# Patient Record
(demographics unavailable — no encounter records)

---

## 2024-10-10 NOTE — HISTORY OF PRESENT ILLNESS
[de-identified] : Patient is status post left distal biceps tendon repair on 10/2/2023.  Having some soreness  Having decreased sensation over ulnar 3 digits, this has been since the injury and is on the operative elbow where he injured his biceps  Left elbow: Incisions healed and staples removed, biceps tendon in normal position within normal hook and biceps tendon distally retracted position, no gross motor or sensory abnormalities, compartment soft and nontender, rom 2-130 (able to passively fully extend), lacks 10 deg supination, weakness and atrophy to biceps, decreased sensation ulna 3 digits with compression over ulnar nerve  right elbow: ttp lat epicondyle, pain with res elbow ext at lateral epicondyle, nvi  Status post left distal biceps tendon repair and right elbow lateral epicondylitis, with possible left elbow cubital tunnel syndrome went over findings explained the surgery will cont with ortho hand  cont cons tx for right elbow lateral epicondylitis cont pt and pain control fu in 2 months  not working with temp total disability

## 2024-11-13 NOTE — ASSESSMENT
[FreeTextEntry1] : The patient is here for follow-up after left cubital tunnel release and endoscopic carpal tunnel release. He is still in therapy and wants to know how intense he can exercise. When exercising he will feel tingling shooting down to his hand from his elbow when he starts to feel a "burn" in his muscles. When he is at rest and his arm is hanging down he feels a tingling in his arm. The numbness at rest in his hand is improved, previously he had burning pain and more intense baseline numbness that would flare up worse.  He did not have tingling prior.  He only had numbness and burning pain.  Left upper extremity: Incision site is well-healed, slight sensitivity at the elbow incision, no change in range of motion of the elbow, incision site at the wrist healed, scar tissue in both areas, sensation somewhat improved however still decreased  We discussed not overdoing the  strengthening or flexing the elbow past 90 degrees and to discuss this with his therapist. We discussed pushing himself without pushing himself to the point of numbness. We discussed that the tingling he is feeling could signal the regeneration of the nerve.  He will follow up in 8 weeks for evaluation.

## 2024-11-18 NOTE — HISTORY OF PRESENT ILLNESS
[de-identified] : 52-year-old male returns for follow-up.  Continues to have neck pain radiating down his entire left arm.  It is fairly significant interfering with his quality of life.  No loss of bladder or bowel.

## 2024-11-18 NOTE — DISCUSSION/SUMMARY
[de-identified] : 52-year-old male with cervical radiculopathy.  He has multilevel degenerative disc disease and foraminal stenosis.  I would have him follow-up with Dr. Mc from pain management to discuss injections.  If he fails injections he could be a candidate for cervical spine surgery.  I will see him back in 3 months.

## 2024-11-18 NOTE — DATA REVIEWED
[FreeTextEntry1] : I reviewed the patient's MRI of his cervical spine he has multilevel degenerative disc disease.  He has foraminal stenosis C3-C7.
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
## 2024-12-11 NOTE — DATA REVIEWED
[FreeTextEntry1] : MRI of the cervical spine taken @ [facility] on 7- showed marked straightening of the usual lordosis. There ois a disc bulge at the C2-3 level where disc material approximates the ventral thecal sac and is encroaching on the ventral subarachnoid space. At this level a contour abnormality of the thecal sac due to the annulus fibrosus of the disc appreciated. There is also cerebrospinal fluid displacement due to the combination of the annulus fibrosus in combination with the nucleus pulposus of the disc, encroaching on the thecal sac. There is a disc bulge at the C3-4 level where disc material approximates the ventral thecal sac and is encroaching on the ventral subarachnoid space. At the C4-5, there is as shallow central disc herniation encroaching on the thecal sac and effacing the ventral subarachnoid space, producing mass effect on the cervical cord. At C5-6, there is a shallow central disc herniation encroaching on the thecal sac and effacing the ventral subarachnoid space, producing mass effect on the cervical cord. At C6-7, there is a shallow central disc herniation encroaching on the thecal sac and effacing the ventral subarachnoid space, producing mass effect on the cervical cord. There is a posterior annular tear at the C4-5 level characteriszed by high signal focus on the long TR sagittal images.

## 2024-12-11 NOTE — DISCUSSION/SUMMARY
[de-identified] : A discussion regarding available pain management treatment options occurred with the patient. These included interventional, rehabilitative, pharmacological, and alternative modalities. We will proceed with the following:    Interventional treatment options: 1. Proceed with a C7-T1 cervical epidural steroid injection under fluoroscopic guidance and sedation.  Treatment options were discussed. The patient has been having persistent, severe neck pain and cervical radicular pain that has minimally improved with conservative therapy thus far (including physical therapy/chiropractic manipulations/home stretching and anti-inflammatory medications for 8 weeks. The patient was given the option of proceeding with a cervical epidural steroid injection to try to get the patient some pain relief.   The risks and benefits were discussed, which included the associated problems with steroids, bleeding, nerve injury, lack of improvement with pain, and 0.5% chance of a post dural puncture headache.   The patient has severe anxiety of procedures that necessitates monitored anesthesia care (MAC). The procedure performed will be close to major nerves, arteries, and spinal cord and/or joint structures. Due to the proximity of these structures, we need the patient to be still during the procedure.  With the help of MAC, this will be safely achieved and decrease the risk of any complications.   Rehabilitative options: 1. Participate in physical therapy for the cervical spine, 2/3x per week for 6-8 weeks.   Medications: 1. Ordered Gabapentin 300 mg q8h prn.   We are going to start gabapentin. Potential side effects were discussed which included dizziness, sedation, and pedal edema to name a few. If the patient cannot tolerate these side effects should they occur, then they will stop the medication. If the patient experiences sedation, they understand that they should not drive. The usage of the medication was discussed and the patient understands that it is an anti-epileptic medication used for neuropathic pain and that the pain relief from this medication will likely be subtle, and that hopefully in combination with the other treatments we are offering we will be able to get some additive relief.   - Follow up 2 weeks post injection.   I Pamela Guerrier attest that this documentation has been prepared under the direction and in the presence of provider Dr. Gideon Mc.  The documentation recorded by the scribe in my presence, accurately reflects the service I personally performed, and the decisions made by me with my edits as appropriate.   Gideon Mc, DO

## 2024-12-11 NOTE — PHYSICAL EXAM
[de-identified] : C spine  No rashes, erythema, edema noted TTP b/l cervical paraspinal and trapezius muscles Positive spurlings bilaterally RUE: 5/5 strength LUE: 5/5 strength

## 2024-12-11 NOTE — DISCUSSION/SUMMARY
[de-identified] : A discussion regarding available pain management treatment options occurred with the patient. These included interventional, rehabilitative, pharmacological, and alternative modalities. We will proceed with the following:    Interventional treatment options: 1. Proceed with a C7-T1 cervical epidural steroid injection under fluoroscopic guidance and sedation.  Treatment options were discussed. The patient has been having persistent, severe neck pain and cervical radicular pain that has minimally improved with conservative therapy thus far (including physical therapy/chiropractic manipulations/home stretching and anti-inflammatory medications for 8 weeks. The patient was given the option of proceeding with a cervical epidural steroid injection to try to get the patient some pain relief.   The risks and benefits were discussed, which included the associated problems with steroids, bleeding, nerve injury, lack of improvement with pain, and 0.5% chance of a post dural puncture headache.   The patient has severe anxiety of procedures that necessitates monitored anesthesia care (MAC). The procedure performed will be close to major nerves, arteries, and spinal cord and/or joint structures. Due to the proximity of these structures, we need the patient to be still during the procedure.  With the help of MAC, this will be safely achieved and decrease the risk of any complications.   Rehabilitative options: 1. Participate in physical therapy for the cervical spine, 2/3x per week for 6-8 weeks.   Medications: 1. Ordered Gabapentin 300 mg q8h prn.   We are going to start gabapentin. Potential side effects were discussed which included dizziness, sedation, and pedal edema to name a few. If the patient cannot tolerate these side effects should they occur, then they will stop the medication. If the patient experiences sedation, they understand that they should not drive. The usage of the medication was discussed and the patient understands that it is an anti-epileptic medication used for neuropathic pain and that the pain relief from this medication will likely be subtle, and that hopefully in combination with the other treatments we are offering we will be able to get some additive relief.   - Follow up 2 weeks post injection.   I Pamela Guerrier attest that this documentation has been prepared under the direction and in the presence of provider Dr. Gideon Mc.  The documentation recorded by the scribe in my presence, accurately reflects the service I personally performed, and the decisions made by me with my edits as appropriate.   Gideon Mc, DO

## 2024-12-11 NOTE — PHYSICAL EXAM
[de-identified] : C spine  No rashes, erythema, edema noted TTP b/l cervical paraspinal and trapezius muscles Positive spurlings bilaterally RUE: 5/5 strength LUE: 5/5 strength

## 2024-12-26 NOTE — PROCEDURE
[FreeTextEntry3] : Date of Service: 12/23/2024   Account: [id number]  Patient: JOSÉ MIGUEL INMAN   YOB: 1972  Age: 52 year  Surgeon:      Gideon Mc D.O.  Assistant:    None  Pre-Operative Diagnosis:         Cervical Radiculopathy (M54.12)  Post Operative Diagnosis:       Cervical Radiculopathy (M54.12)  Procedure:             Cervical (C7-T1) interlaminar epidural steroid injection under fluoroscopic guidance  Anesthesia:  MAC  This procedure was carried out using fluoroscopic guidance.  The risks and benefits of the procedure were discussed extensively with the patient.  The consent of the patient was obtained and the following procedure was performed. The patient was placed in the prone position on the fluoroscopy table and the area was prepped and draped in a sterile fashion.  A timeout was performed with all essential staff present and the site and side were verified.  The patient was placed in the prone position and optimized to patient comfort.  The cervical area was prepped and draped in a sterile fashion.  The fluoroscope visualized the C7-T1 interspace using slight cephalad-caudad angulation and this area was marked.  Using sterile technique the superficial skin was anesthetized with 1% Lidocaine.  A 20 gauge 3.5 inch Tuohy needle was advanced under fluoroscopy until ligament was engaged.  Using a contralateral oblique view, a "loss of resistance" to air technique was utilized in order to gain access to the epidural space.  After negative aspiration for heme and CSF, 1 cc of Omnipaque contrast was administered and the appropriate cervical epidurogram was obtained in the BIRGIT and A/P view as well as digital subtraction angiography.  A total injectate of 3 cc of preservative free normal saline and 10mg decadron was then injected into the epidural space while maintaining meaningful verbal contact with the patient.    The needle was subsequently removed.  Vital signs remained normal.  Pulse oximeter was used throughout the procedure and the patient's pulse and oxygen saturation remained within normal limits.  The patient tolerated the procedure well.  There were no complications.  The patient was instructed to apply ice over the injection sites for twenty minutes every two hours for the next 24 to 48 hours.  Disposition:       1. The patient was advised to F/U in 1-2 weeks to assess the response to the injection.      2. The patient was also instructed to contact me immediately if there were any concerns related to the procedure performed.
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
## 2025-02-04 NOTE — PHYSICAL EXAM
[de-identified] : C spine  No rashes, erythema, edema noted TTP b/l cervical paraspinal and trapezius muscles Positive spurlings L RUE: 5/5 strength LUE: Bicep 4/5 Tricep 4/5

## 2025-02-04 NOTE — DISCUSSION/SUMMARY
[de-identified] : A discussion regarding available pain management treatment options occurred with the patient. These included interventional, rehabilitative, pharmacological, and alternative modalities. We will proceed with the following:    Interventional treatment options: 1. Proceed with a C7-T1 cervical epidural steroid injection under fluoroscopic guidance. Treatment options were discussed. The patient has been having persistent, severe neck pain and cervical radicular pain that has minimally improved with conservative therapy thus far (including physical therapy/chiropractic manipulations/home stretching and anti-inflammatory medications for 8 weeks. The patient was given the option of proceeding with a cervical epidural steroid injection to try to get the patient some pain relief.   The risks and benefits were discussed, which included the associated problems with steroids, bleeding, nerve injury, lack of improvement with pain, and 0.5% chance of a post dural puncture headache.   Medications: 1. Ordered Gabapentin 300 mg q8h prn (30-day supply, 90 tablets) 2. Ordered Ibuprofen 800 mg q12h (30-day supply, 60 tablets)  I advised Mr. Baker that the NSAID should be taken with food.  In addition while taking the prescribed NSAID, no over the counter or other NSAIDs should be used, such as ibuprofen (Motrin or Advil) or naproxen (Aleve) as this can cause stomach upset or other side effects.  If needed for fever or breakthrough pain Tylenol can be used.  We are going to start gabapentin. Potential side effects were discussed which included dizziness, sedation, and pedal edema to name a few. If the patient cannot tolerate these side effects should they occur, then they will stop the medication. If the patient experiences sedation, they understand that they should not drive. The usage of the medication was discussed and the patient understands that it is an anti-epileptic medication used for neuropathic pain and that the pain relief from this medication will likely be subtle, and that hopefully in combination with the other treatments we are offering we will be able to get some additive relief.   - Follow up 2 weeks post injection.   I Pamela Guerrier attest that this documentation has been prepared under the direction and in the presence of provider Dr. Gideon Mc.  The documentation recorded by the scribe in my presence, accurately reflects the service I personally performed, and the decisions made by me with my edits as appropriate.   Gideon Mc, DO

## 2025-02-04 NOTE — PHYSICAL EXAM
[de-identified] : C spine  No rashes, erythema, edema noted TTP b/l cervical paraspinal and trapezius muscles Positive spurlings L RUE: 5/5 strength LUE: Bicep 4/5 Tricep 4/5

## 2025-02-04 NOTE — DISCUSSION/SUMMARY
[de-identified] : A discussion regarding available pain management treatment options occurred with the patient. These included interventional, rehabilitative, pharmacological, and alternative modalities. We will proceed with the following:    Interventional treatment options: 1. Proceed with a C7-T1 cervical epidural steroid injection under fluoroscopic guidance. Treatment options were discussed. The patient has been having persistent, severe neck pain and cervical radicular pain that has minimally improved with conservative therapy thus far (including physical therapy/chiropractic manipulations/home stretching and anti-inflammatory medications for 8 weeks. The patient was given the option of proceeding with a cervical epidural steroid injection to try to get the patient some pain relief.   The risks and benefits were discussed, which included the associated problems with steroids, bleeding, nerve injury, lack of improvement with pain, and 0.5% chance of a post dural puncture headache.   Medications: 1. Ordered Gabapentin 300 mg q8h prn (30-day supply, 90 tablets) 2. Ordered Ibuprofen 800 mg q12h (30-day supply, 60 tablets)  I advised Mr. Baker that the NSAID should be taken with food.  In addition while taking the prescribed NSAID, no over the counter or other NSAIDs should be used, such as ibuprofen (Motrin or Advil) or naproxen (Aleve) as this can cause stomach upset or other side effects.  If needed for fever or breakthrough pain Tylenol can be used.  We are going to start gabapentin. Potential side effects were discussed which included dizziness, sedation, and pedal edema to name a few. If the patient cannot tolerate these side effects should they occur, then they will stop the medication. If the patient experiences sedation, they understand that they should not drive. The usage of the medication was discussed and the patient understands that it is an anti-epileptic medication used for neuropathic pain and that the pain relief from this medication will likely be subtle, and that hopefully in combination with the other treatments we are offering we will be able to get some additive relief.   - Follow up 2 weeks post injection.   I Pamela Guerrier attest that this documentation has been prepared under the direction and in the presence of provider Dr. Gideon Mc.  The documentation recorded by the scribe in my presence, accurately reflects the service I personally performed, and the decisions made by me with my edits as appropriate.   Gideon Mc, DO
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
## 2025-02-12 NOTE — HISTORY OF PRESENT ILLNESS
[de-identified] : 52-year-old male presents to me with continued neck pain radiating down his left arm.  He has had 1 injection.  This helped him a little bit but still having pain he has more injections scheduled.  He is also complaining of some left elbow pain.  He feels like his left arm is weak.

## 2025-02-12 NOTE — DATA REVIEWED
[FreeTextEntry1] : I reviewed patient's MRI of the cervical spine he has multilevel degenerative disc disease with foraminal stenosis C4 C7

## 2025-02-13 NOTE — HISTORY OF PRESENT ILLNESS
[de-identified] : Patient is status post left distal biceps tendon repair on 10/2/2023.  Having some soreness  Having decreased sensation over ulnar 3 digits, this has been since the injury and is on the operative elbow where he injured his biceps  he has been having pain to the left shoulder  Left elbow: Incisions healed and staples removed, biceps tendon in normal position within normal hook and biceps tendon distally retracted position, no gross motor or sensory abnormalities, compartment soft and nontender, rom 2-130 (able to passively fully extend), lacks 10 deg supination, weakness and atrophy to biceps, decreased sensation ulna 3 digits with compression over ulnar nerve  right elbow: ttp lat epicondyle, pain with res elbow ext at lateral epicondyle, nvi  Left shoulder: TTP ant GH joint, bicipital groove FF 0-140 (passive 175) ER 40 IR L5 Pain with terminal rom Weakness to abduction and ER Pos Impingement Pos Quintero Pos Cross Arm Adduction Negative instability Positive scapula dyskinesia  xray left shoulder negative  Status post left distal biceps tendon repair and right elbow lateral epicondylitis, with possible left elbow cubital tunnel syndrome went over findings explained the surgery will cont with ortho hand  cont cons tx for right elbow lateral epicondylitis cont pt and pain control due to cont pain to left shoulder will get an mri fu in 2 months  not working with temp total disability

## 2025-04-10 NOTE — HISTORY OF PRESENT ILLNESS
[de-identified] : Patient is status post left distal biceps tendon repair on 10/2/2023.  Having some soreness  Having decreased sensation over ulnar 3 digits, this has been since the injury and is on the operative elbow where he injured his biceps  he has been having pain to the left shoulder  Left elbow: Incisions healed and staples removed, biceps tendon in normal position within normal hook and biceps tendon distally retracted position, no gross motor or sensory abnormalities, compartment soft and nontender, rom 2-130 (able to passively fully extend), lacks 10 deg supination, weakness and atrophy to biceps, decreased sensation ulna 3 digits with compression over ulnar nerve  right elbow: ttp lat epicondyle, pain with res elbow ext at lateral epicondyle, nvi  Left shoulder: TTP ant GH joint, bicipital groove FF 0-140 (passive 175) ER 40 IR L5 Pain with terminal rom Weakness to abduction and ER Pos Impingement Pos Quintero Pos Cross Arm Adduction Negative instability Positive scapula dyskinesia  xray left shoulder negative  mri left shoulder possible cystic lesion glenoid, pRCT high grade, biceps tenosynovitis, slap tear  Status post left distal biceps tendon repair and right elbow lateral epicondylitis, with possible left elbow cubital tunnel syndrome went over findings explained the surgery will cont with ortho hand  cont cons tx for right elbow lateral epicondylitis cont pt and pain control needs mri with and without contrast left shoulder to assess glenoid lesion  not working with temp total disability

## 2025-06-03 NOTE — HISTORY OF PRESENT ILLNESS
[de-identified] : Patient is status post left distal biceps tendon repair on 10/2/2023.  Having some soreness  Having decreased sensation over ulnar 3 digits, this has been since the injury and is on the operative elbow where he injured his biceps  he has been having pain to the left shoulder  Left elbow: Incisions healed and staples removed, biceps tendon in normal position within normal hook and biceps tendon distally retracted position, no gross motor or sensory abnormalities, compartment soft and nontender, rom 2-130 (able to passively fully extend), lacks 10 deg supination, weakness and atrophy to biceps, decreased sensation ulna 3 digits with compression over ulnar nerve  right elbow: ttp lat epicondyle, pain with res elbow ext at lateral epicondyle, nvi  Left shoulder: TTP ant GH joint, bicipital groove FF 0-140 (passive 175) ER 40 IR L5 Pain with terminal rom Weakness to abduction and ER Pos Impingement Pos Quintero Pos Cross Arm Adduction Negative instability Positive scapula dyskinesia  xray left shoulder negative  mri left shoulder possible cystic lesion glenoid, pRCT high grade, biceps tenosynovitis, slap tear  mri left shoulder IMPRESSION: 1.  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 2.  Additional findings as above.  Status post left distal biceps tendon repair and right elbow lateral epicondylitis, with possible left elbow cubital tunnel syndrome went over findings explained the surgery will cont with ortho hand  cont cons tx for right elbow lateral epicondylitis cont pt and pain control pt left shoulder, will think about surgery for shoulder if no improvement  not working with temp total disability

## 2025-07-15 NOTE — HISTORY OF PRESENT ILLNESS
[de-identified] : Patient is status post left distal biceps tendon repair on 10/2/2023.  Having some soreness  Having decreased sensation over ulnar 3 digits, this has been since the injury and is on the operative elbow where he injured his biceps  he has been having pain to the left shoulder  Left elbow: Incisions healed and staples removed, biceps tendon in normal position within normal hook and biceps tendon distally retracted position, no gross motor or sensory abnormalities, compartment soft and nontender, rom 2-130 (able to passively fully extend), lacks 10 deg supination, weakness and atrophy to biceps, decreased sensation ulna 3 digits with compression over ulnar nerve  right elbow: ttp lat epicondyle, pain with res elbow ext at lateral epicondyle, nvi  Left shoulder: TTP ant GH joint, bicipital groove FF 0-140 (passive 175) ER 40 IR L5 Pain with terminal rom Weakness to abduction and ER Pos Impingement Pos Quintero Pos Cross Arm Adduction Negative instability Positive scapula dyskinesia  xray left shoulder negative  mri left shoulder possible cystic lesion glenoid, pRCT high grade, biceps tenosynovitis, slap tear  mri left shoulder IMPRESSION: 1.  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 2.  Additional findings as above.  Status post left distal biceps tendon repair and right elbow lateral epicondylitis, with possible left elbow cubital tunnel syndrome went over findings explained the surgery will cont with ortho hand  cont cons tx for right elbow lateral epicondylitis cont pt and pain control pt left shoulder, will think about surgery for shoulder if no improvement  not working with temp total disability